# Patient Record
(demographics unavailable — no encounter records)

---

## 2024-11-08 NOTE — HISTORY OF PRESENT ILLNESS
[FreeTextEntry1] : 61 y/o F referred by Dr Merino. She has a PMHx of HTN, HLD, and bilateral, symptomatic LE varicose veins. We had discussed during original appt on 9/25/24 to complete bilt stab phlebectomy, one leg at a time. Now, she is s/p RLE stab phlebectomy on 11/4/24.  Since the surgery, she reports feeling well and has been cleaning incisions with peroxide BID. Denies any bleeding, redness, fever/chills or drainage from incision sites. She has some localized discomfort and bruising but not significant enough to take medication for pain. She wonders how soon the surgery can be done for the left leg. Denies any wounds, leg swelling, or symptoms/signs of SVT.  SHx: -Retired (used to work at [employer]) -Nonsmoker -Mother of 4

## 2024-11-08 NOTE — PHYSICAL EXAM
[Respiratory Effort] : normal respiratory effort [Varicose Veins Of Lower Extremities] : present [Varicose Veins Of The Left Leg] : of the left leg [Ankle Swelling On The Left] : moderate [Alert] : alert [Oriented to Person] : oriented to person [Oriented to Place] : oriented to place [Oriented to Time] : oriented to time [Calm] : calm [Ankle Swelling (On Exam)] : not present [] : not present [de-identified] : WN/WD [FreeTextEntry1] : Large, bulging varicose veins on LLE posterior thigh and calf; also, proximal anterior thigh/groin. Scattered incisions throughout the R posterior thigh and proximal calf with surrounding ecchymosis, soft, no signs of infection or phlebitis. Sutures in place. Venous telangiectasia on the left thigh and posterior calf. [de-identified] : FROM [de-identified] : See cardiovascular section

## 2024-11-08 NOTE — ASSESSMENT
[Arterial/Venous Disease] : arterial/venous disease [FreeTextEntry1] : 53 y/o F w/ symptomatic bilt LEs varicose veins. Now, she is s/p RLE stab phlebectomy on 11/4/24. On exam, large, bulging varicose veins on LLE posterior thigh and calf; also, proximal anterior thigh/groin. Scattered incisions throughout the R posterior thigh and proximal calf with surrounding ecchymosis, soft, no signs of infection or phlebitis. Sutures in place. Venous telangiectasia on the left thigh and posterior calf. All sutures were removed and steri strips applied. Steri strips to fall off on their own or pt to remove after 7 days. Shower daily, allow soap and water to run over the incision and pad dry. No more peroxide needed. Findings discussed with pt and recommend now LLE stab phlebectomy at least one month from now. Risk, complications and alternatives were discussed, all questions were answered. Patient would like to proceed with surgery. but no openings currently prior to the holidays so will likely proceed in 2025. Will make appropriate arrangements.